# Patient Record
Sex: FEMALE | Race: WHITE
[De-identification: names, ages, dates, MRNs, and addresses within clinical notes are randomized per-mention and may not be internally consistent; named-entity substitution may affect disease eponyms.]

---

## 2019-02-11 ENCOUNTER — HOSPITAL ENCOUNTER (OUTPATIENT)
Dept: HOSPITAL 92 - CCL | Age: 39
End: 2019-02-11
Attending: INTERNAL MEDICINE
Payer: COMMERCIAL

## 2019-02-11 VITALS — BODY MASS INDEX: 22.6 KG/M2

## 2019-02-11 DIAGNOSIS — Z86.73: ICD-10-CM

## 2019-02-11 DIAGNOSIS — Z98.890: ICD-10-CM

## 2019-02-11 DIAGNOSIS — Z79.82: ICD-10-CM

## 2019-02-11 DIAGNOSIS — I48.1: Primary | ICD-10-CM

## 2019-02-11 PROCEDURE — 0JPT32Z REMOVAL OF MONITORING DEVICE FROM TRUNK SUBCUTANEOUS TISSUE AND FASCIA, PERCUTANEOUS APPROACH: ICD-10-PCS | Performed by: INTERNAL MEDICINE

## 2019-02-11 NOTE — OP
DATE OF PROCEDURE:  02/11/2019



PROCEDURE PERFORMED:  LINQ recorder removal report.



REASON FOR PROCEDURE:  Mrs. Carlos has had a prior history of atrial fibrillation,

TIA, and had atrial fibrillation episodes monitored by this device.  Now, the device

depleted, then she wishes removal.  She does not wish replacement. 



DESCRIPTION OF PROCEDURE:  The patient received prep, drape, and subcutaneous

lidocaine with epinephrine at the loop recorder insertion site.  With standard

blade, incision was made over the loop recorder insertion site and the help of

hemostat, the loop recorder was extracted.  The wound was closed with Dermabond and

Steri-Strips.  The patient tolerated the procedure well.  No complications noted. 



PLAN:  Routine wound healing and routine followup.







Job ID:  [id number]